# Patient Record
Sex: FEMALE | Race: WHITE | NOT HISPANIC OR LATINO | Employment: FULL TIME | URBAN - METROPOLITAN AREA
[De-identification: names, ages, dates, MRNs, and addresses within clinical notes are randomized per-mention and may not be internally consistent; named-entity substitution may affect disease eponyms.]

---

## 2020-04-14 ENCOUNTER — TELEPHONE (OUTPATIENT)
Dept: OBSTETRICS AND GYNECOLOGY | Facility: CLINIC | Age: 45
End: 2020-04-14

## 2020-04-14 DIAGNOSIS — B37.31 YEAST VAGINITIS: Primary | ICD-10-CM

## 2020-04-14 RX ORDER — NAFTIFINE HYDROCHLORIDE 20 MG/G
CREAM TOPICAL
COMMUNITY

## 2020-04-14 RX ORDER — CLONAZEPAM 1 MG/1
TABLET ORAL
COMMUNITY

## 2020-04-14 RX ORDER — CLOTRIMAZOLE AND BETAMETHASONE DIPROPIONATE 10; .64 MG/G; MG/G
CREAM TOPICAL
COMMUNITY

## 2020-04-14 RX ORDER — AZITHROMYCIN 250 MG/1
TABLET, FILM COATED ORAL
COMMUNITY

## 2020-04-14 RX ORDER — FLUCONAZOLE 150 MG/1
TABLET ORAL
COMMUNITY

## 2020-04-14 RX ORDER — AZITHROMYCIN 500 MG/1
TABLET, FILM COATED ORAL
COMMUNITY
Start: 2020-03-12

## 2020-04-14 RX ORDER — DIVALPROEX SODIUM 500 MG/1
TABLET, DELAYED RELEASE ORAL
COMMUNITY

## 2020-04-14 RX ORDER — VALACYCLOVIR HYDROCHLORIDE 500 MG/1
TABLET, FILM COATED ORAL
COMMUNITY

## 2020-04-14 RX ORDER — PHENAZOPYRIDINE HYDROCHLORIDE 200 MG/1
TABLET, FILM COATED ORAL
COMMUNITY

## 2020-04-14 RX ORDER — CLOBETASOL PROPIONATE 0.5 MG/G
CREAM TOPICAL
COMMUNITY
Start: 2020-02-24

## 2020-04-14 RX ORDER — CIPROFLOXACIN 500 MG/1
TABLET ORAL
COMMUNITY

## 2020-04-14 RX ORDER — TINIDAZOLE 500 MG/1
TABLET ORAL
COMMUNITY

## 2020-04-14 RX ORDER — FLUCONAZOLE 150 MG/1
150 TABLET ORAL EVERY OTHER DAY
Qty: 2 TABLET | Refills: 0 | Status: SHIPPED | OUTPATIENT
Start: 2020-04-14 | End: 2020-04-18

## 2020-04-14 RX ORDER — METRONIDAZOLE 500 MG/1
TABLET ORAL
COMMUNITY
Start: 2020-03-12

## 2020-04-14 RX ORDER — FLUCONAZOLE 200 MG/1
TABLET ORAL
COMMUNITY
Start: 2020-03-05 | End: 2020-04-14

## 2020-04-14 RX ORDER — CLOBETASOL PROPIONATE 0.5 MG/G
OINTMENT TOPICAL
COMMUNITY

## 2020-04-14 NOTE — TELEPHONE ENCOUNTER
"Angélica Noland is a 44 y.o. female who presents to the clinic today with complaints of vaginal discharge.    The patient location is: home ([place])  The chief complaint leading to consultation is: vaginal discharge  Visit type: audio only  Total time spent with patient: 10 mins   Each patient to whom he or she provides medical services by telemedicine is:  (1) informed of the relationship between the physician and patient and the respective role of any other health care provider with respect to management of the patient; and (2) notified that he or she may decline to receive medical services by telemedicine and may withdraw from such care at any time.    HPI:  Vaginal discharge  Patient telephones the clinic with reports of what she believes to be a yeast infection. She states "Anytime I take a new medication I get a yeast infection and I started a new bipolar medication." She reports white clumpy discharge since last Tuesday. She denies any OTC treatment regimens to date.     Objective  Unable to perform due to patient being evaluated via telephone as a result of current coronavirus pandemic.     Assessment & Plan:  Yeast Vaginitis   Treatment per orders. Discussed medication regimen with patient.   Patient education provided regarding the following:  · Keep the vaginal area clean and dry.   · Warm moist environments being a good breeding ground for yeast.   · Shower promptly after exercising.   · Wear cotton underwear and loose fitting clothes on the bottom.   · If you swim, try not to stay in a wet bathing suit too long.   · Decreasing carbs and sugar in the diet can also prevent yeast infections.     Instructed to contact the office with questions, concerns, or failure of symptom improvement.       "

## 2021-07-08 ENCOUNTER — TELEPHONE (OUTPATIENT)
Dept: SMOKING CESSATION | Facility: CLINIC | Age: 46
End: 2021-07-08

## 2022-05-09 PROBLEM — S56.912D FOREARM STRAIN, LEFT, SUBSEQUENT ENCOUNTER: Status: ACTIVE | Noted: 2022-05-09
